# Patient Record
Sex: FEMALE | Race: OTHER | RURAL
[De-identification: names, ages, dates, MRNs, and addresses within clinical notes are randomized per-mention and may not be internally consistent; named-entity substitution may affect disease eponyms.]

---

## 2018-03-02 NOTE — PATIENT DISCUSSION
I explained to the patient that they have a macular hole, which is causing a defect in central vision.

## 2019-01-11 NOTE — PATIENT DISCUSSION
If the macular hole does not close after the initial surgery, re-operation can be considered which has a remarkably high chance of closing the macular hole.

## 2020-06-12 NOTE — PATIENT DISCUSSION
Mrs. Mala Michelle macular hole remains closed and I have recommended observation for the membrane in the right eye. I have asked her to return on an as needed basis.

## 2024-07-29 ENCOUNTER — NEW PATIENT (OUTPATIENT)
Dept: RURAL CLINIC 1 | Facility: CLINIC | Age: 29
End: 2024-07-29

## 2024-07-29 DIAGNOSIS — H52.13: ICD-10-CM

## 2024-07-29 PROCEDURE — S0620AEC ROUTINE OPH EXAM INCLUDES REF/ NEW PATIENT

## 2024-07-29 ASSESSMENT — VISUAL ACUITY
OU_SC: 20/20
OD_SC: 20/25
OU_SC: 20/20
OS_SC: 20/20
OS_SC: 20/25
OD_SC: 20/20

## 2024-07-29 ASSESSMENT — TONOMETRY
OS_IOP_MMHG: 13
OD_IOP_MMHG: 13